# Patient Record
Sex: MALE | Race: WHITE | NOT HISPANIC OR LATINO
[De-identification: names, ages, dates, MRNs, and addresses within clinical notes are randomized per-mention and may not be internally consistent; named-entity substitution may affect disease eponyms.]

---

## 2020-11-19 ENCOUNTER — NON-APPOINTMENT (OUTPATIENT)
Age: 55
End: 2020-11-19

## 2020-11-19 ENCOUNTER — APPOINTMENT (OUTPATIENT)
Dept: CARDIOLOGY | Facility: CLINIC | Age: 55
End: 2020-11-19
Payer: COMMERCIAL

## 2020-11-19 VITALS
BODY MASS INDEX: 41.36 KG/M2 | SYSTOLIC BLOOD PRESSURE: 130 MMHG | WEIGHT: 272 LBS | OXYGEN SATURATION: 98 % | DIASTOLIC BLOOD PRESSURE: 78 MMHG | HEART RATE: 75 BPM | TEMPERATURE: 96.5 F

## 2020-11-19 VITALS — DIASTOLIC BLOOD PRESSURE: 70 MMHG | SYSTOLIC BLOOD PRESSURE: 130 MMHG

## 2020-11-19 DIAGNOSIS — K76.0 FATTY (CHANGE OF) LIVER, NOT ELSEWHERE CLASSIFIED: ICD-10-CM

## 2020-11-19 DIAGNOSIS — Z86.39 PERSONAL HISTORY OF OTHER ENDOCRINE, NUTRITIONAL AND METABOLIC DISEASE: ICD-10-CM

## 2020-11-19 DIAGNOSIS — R73.03 PREDIABETES.: ICD-10-CM

## 2020-11-19 PROCEDURE — 93000 ELECTROCARDIOGRAM COMPLETE: CPT

## 2020-11-19 PROCEDURE — 99204 OFFICE O/P NEW MOD 45 MIN: CPT | Mod: 25

## 2020-11-19 NOTE — REVIEW OF SYSTEMS
[Recent Weight Gain (___ Lbs)] : recent [unfilled] ~Ulb weight gain [Feeling Fatigued] : feeling fatigued [Blurry Vision] : no blurred vision [Seeing Double (Diplopia)] : no diplopia [Earache] : no earache [Discharge From The Ears] : no discharge from the ears [Mouth Sores] : no mouth sores [Dyspnea on exertion] : not dyspnea during exertion [Chest Pain] : no chest pain [Lower Ext Edema] : no extremity edema [Cough] : no cough [Wheezing] : no wheezing [Abdominal Pain] : no abdominal pain [Nausea] : no nausea [Heartburn] : no heartburn [Urinary Frequency] : no change in urinary frequency [Hematuria] : no hematuria [Impotence] : no impotence [Joint Pain] : no joint pain [Joint Swelling] : no joint swelling [Skin: A Rash] : no rash: [Skin Lesions] : no skin lesions [Dizziness] : no dizziness [Tremor] : no tremor was seen [Confusion] : no confusion was observed [Memory Lapses Or Loss] : no memory lapses or loss [Anxiety] : no anxiety [Excessive Thirst] : no polydipsia [Easy Bleeding] : no tendency for easy bleeding [Easy Bruising] : no tendency for easy bruising

## 2020-11-19 NOTE — ASSESSMENT
[FreeTextEntry1] : This is a 55-year-old male with prediabetes, hyperlipidemia, fatty liver, morbid obesity and prior history of DVT.  He also has erectile dysfunction with low testosterone.  His blood pressure is at goal at this time and he has no cardiac issues that he complains of.  His his sleep is fragmented but he denies any history of snoring or obstructive sleep apnea.\par EKG at rest is normal.\par We spoke in length regarding diet and weight loss.  He should be on a caloric restricted diet of about 15 to 1700 kcal/day.  He should also exercise for 45 minutes/day 5 days a week of aerobic exercise.\par Due to prior history of DVT of right leg in 2012 and murmur heard on exam I will reevaluate for presence of pulmonary hypertension and valvular heart disease.\par He is prediabetic and I think he may benefit with his fatty liver from use of Metformin.  Side effects of this medication was discussed with the patient.\par Given his risk factors and exercise a stress test will be performed for risk stratification.\par He will follow-up with us in about 6 months.  I will speak with him about test results.  He is also advised to see his PMD closely.
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
## 2020-11-19 NOTE — PHYSICAL EXAM
[Well Groomed] : well groomed [General Appearance - In No Acute Distress] : no acute distress [Normal Conjunctiva] : the conjunctiva exhibited no abnormalities [Normal Jugular Venous A Waves Present] : normal jugular venous A waves present [Normal Jugular Venous V Waves Present] : normal jugular venous V waves present [No Jugular Venous Alcantar A Waves] : no jugular venous alcantar A waves [Heart Rate And Rhythm] : heart rate and rhythm were normal [Heart Sounds] : normal S1 and S2 [Arterial Pulses Normal] : the arterial pulses were normal [Edema] : no peripheral edema present [Respiration, Rhythm And Depth] : normal respiratory rhythm and effort [Exaggerated Use Of Accessory Muscles For Inspiration] : no accessory muscle use [Auscultation Breath Sounds / Voice Sounds] : lungs were clear to auscultation bilaterally [Bowel Sounds] : normal bowel sounds [Abdomen Soft] : soft [FreeTextEntry1] : Hepatomegaly is appreciated [Abnormal Walk] : normal gait [Gait - Sufficient For Exercise Testing] : the gait was sufficient for exercise testing [Nail Clubbing] : no clubbing of the fingernails [Cyanosis, Localized] : no localized cyanosis [Petechial Hemorrhages (___cm)] : no petechial hemorrhages [Skin Color & Pigmentation] : normal skin color and pigmentation [] : no rash [No Venous Stasis] : no venous stasis [Skin Lesions] : no skin lesions [No Skin Ulcers] : no skin ulcer [No Xanthoma] : no  xanthoma was observed [Oriented To Time, Place, And Person] : oriented to person, place, and time [Affect] : the affect was normal [Mood] : the mood was normal [No Anxiety] : not feeling anxious

## 2020-11-24 RX ORDER — METFORMIN HYDROCHLORIDE 500 MG/1
500 TABLET, COATED ORAL
Qty: 180 | Refills: 1 | Status: ACTIVE | COMMUNITY
Start: 2020-11-19 | End: 1900-01-01

## 2021-01-07 ENCOUNTER — APPOINTMENT (OUTPATIENT)
Dept: CARDIOLOGY | Facility: CLINIC | Age: 56
End: 2021-01-07
Payer: COMMERCIAL

## 2021-01-07 DIAGNOSIS — I51.7 CARDIOMEGALY: ICD-10-CM

## 2021-01-07 PROCEDURE — 93306 TTE W/DOPPLER COMPLETE: CPT

## 2021-01-07 PROCEDURE — 99072 ADDL SUPL MATRL&STAF TM PHE: CPT

## 2021-01-07 PROCEDURE — 93015 CV STRESS TEST SUPVJ I&R: CPT

## 2021-02-02 PROBLEM — I51.7 MODERATE CONCENTRIC LEFT VENTRICULAR HYPERTROPHY: Status: ACTIVE | Noted: 2021-02-02

## 2021-04-01 ENCOUNTER — APPOINTMENT (OUTPATIENT)
Dept: MRI IMAGING | Facility: CLINIC | Age: 56
End: 2021-04-01
Payer: COMMERCIAL

## 2021-04-01 ENCOUNTER — OUTPATIENT (OUTPATIENT)
Dept: OUTPATIENT SERVICES | Facility: HOSPITAL | Age: 56
LOS: 1 days | End: 2021-04-01
Payer: COMMERCIAL

## 2021-04-01 DIAGNOSIS — I51.7 CARDIOMEGALY: ICD-10-CM

## 2021-04-01 PROCEDURE — 75561 CARDIAC MRI FOR MORPH W/DYE: CPT | Mod: 26

## 2021-04-01 PROCEDURE — 75561 CARDIAC MRI FOR MORPH W/DYE: CPT

## 2021-04-01 PROCEDURE — A9585: CPT

## 2021-04-15 ENCOUNTER — RESULT REVIEW (OUTPATIENT)
Age: 56
End: 2021-04-15

## 2021-05-27 ENCOUNTER — APPOINTMENT (OUTPATIENT)
Dept: CARDIOLOGY | Facility: CLINIC | Age: 56
End: 2021-05-27

## 2022-08-25 ENCOUNTER — NON-APPOINTMENT (OUTPATIENT)
Age: 57
End: 2022-08-25

## 2022-08-30 ENCOUNTER — NON-APPOINTMENT (OUTPATIENT)
Age: 57
End: 2022-08-30

## 2022-08-30 ENCOUNTER — APPOINTMENT (OUTPATIENT)
Dept: OPHTHALMOLOGY | Facility: CLINIC | Age: 57
End: 2022-08-30

## 2022-08-30 PROCEDURE — 92014 COMPRE OPH EXAM EST PT 1/>: CPT

## 2022-08-30 PROCEDURE — 92133 CPTRZD OPH DX IMG PST SGM ON: CPT

## 2023-08-29 ENCOUNTER — NON-APPOINTMENT (OUTPATIENT)
Age: 58
End: 2023-08-29

## 2023-08-29 ENCOUNTER — APPOINTMENT (OUTPATIENT)
Dept: OPHTHALMOLOGY | Facility: CLINIC | Age: 58
End: 2023-08-29
Payer: COMMERCIAL

## 2023-08-29 PROCEDURE — 92014 COMPRE OPH EXAM EST PT 1/>: CPT

## 2023-08-29 PROCEDURE — 92133 CPTRZD OPH DX IMG PST SGM ON: CPT

## 2024-08-15 ENCOUNTER — NON-APPOINTMENT (OUTPATIENT)
Age: 59
End: 2024-08-15

## 2024-08-15 ENCOUNTER — APPOINTMENT (OUTPATIENT)
Dept: CARDIOLOGY | Facility: CLINIC | Age: 59
End: 2024-08-15

## 2024-08-15 VITALS
BODY MASS INDEX: 36.07 KG/M2 | OXYGEN SATURATION: 97 % | HEIGHT: 68 IN | HEART RATE: 74 BPM | SYSTOLIC BLOOD PRESSURE: 134 MMHG | DIASTOLIC BLOOD PRESSURE: 78 MMHG | WEIGHT: 238 LBS

## 2024-08-15 DIAGNOSIS — R80.9 TYPE 2 DIABETES MELLITUS WITH OTHER DIABETIC KIDNEY COMPLICATION: ICD-10-CM

## 2024-08-15 DIAGNOSIS — Z86.39 PERSONAL HISTORY OF OTHER ENDOCRINE, NUTRITIONAL AND METABOLIC DISEASE: ICD-10-CM

## 2024-08-15 DIAGNOSIS — Z87.19 PERSONAL HISTORY OF OTHER DISEASES OF THE DIGESTIVE SYSTEM: ICD-10-CM

## 2024-08-15 DIAGNOSIS — R73.03 PREDIABETES.: ICD-10-CM

## 2024-08-15 DIAGNOSIS — E11.29 TYPE 2 DIABETES MELLITUS WITH OTHER DIABETIC KIDNEY COMPLICATION: ICD-10-CM

## 2024-08-15 DIAGNOSIS — Z86.718 PERSONAL HISTORY OF OTHER VENOUS THROMBOSIS AND EMBOLISM: ICD-10-CM

## 2024-08-15 DIAGNOSIS — I51.7 CARDIOMEGALY: ICD-10-CM

## 2024-08-15 PROCEDURE — 99203 OFFICE O/P NEW LOW 30 MIN: CPT | Mod: 25

## 2024-08-15 PROCEDURE — 93000 ELECTROCARDIOGRAM COMPLETE: CPT

## 2024-08-15 RX ORDER — SEMAGLUTIDE 0.68 MG/ML
2 INJECTION, SOLUTION SUBCUTANEOUS
Refills: 0 | Status: ACTIVE | COMMUNITY

## 2024-08-15 RX ORDER — TESTOSTERONE 20.25 MG/1.25G
GEL, METERED TRANSDERMAL
Refills: 0 | Status: ACTIVE | COMMUNITY

## 2024-08-15 RX ORDER — OMEPRAZOLE 20 MG/1
20 CAPSULE, DELAYED RELEASE ORAL
Refills: 0 | Status: ACTIVE | COMMUNITY

## 2024-08-15 RX ORDER — ATORVASTATIN CALCIUM 20 MG/1
20 TABLET, FILM COATED ORAL
Refills: 0 | Status: ACTIVE | COMMUNITY

## 2024-08-15 RX ORDER — LISINOPRIL 5 MG/1
5 TABLET ORAL
Refills: 0 | Status: ACTIVE | COMMUNITY
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
## 2024-08-15 NOTE — CARDIOLOGY SUMMARY
[de-identified] : 8/15/2024:  Sinus Rhythm  WITHIN NORMAL LIMITS [de-identified] : 1/7/2021:  LVEF 68%, moderate concentric LVH, nor significant valve disease [de-identified] : 1/7/2021:  normal ECG response to 10 METs with normal BP response [de-identified] : 4/8/2021 Cardiac MRI with normal LV wall thicknesses and no LGE

## 2024-08-15 NOTE — REVIEW OF SYSTEMS
[Weight Loss (___ Lbs)] : [unfilled] ~Ulb weight loss [Negative] : Heme/Lymph [FreeTextEntry7] : loose stools once a week on Ozempic [FreeTextEntry9] : occasional knee pain

## 2024-08-27 ENCOUNTER — APPOINTMENT (OUTPATIENT)
Dept: OPHTHALMOLOGY | Facility: CLINIC | Age: 59
End: 2024-08-27
Payer: COMMERCIAL

## 2024-08-27 ENCOUNTER — NON-APPOINTMENT (OUTPATIENT)
Age: 59
End: 2024-08-27

## 2024-08-27 PROCEDURE — 92014 COMPRE OPH EXAM EST PT 1/>: CPT

## 2024-08-30 ENCOUNTER — APPOINTMENT (OUTPATIENT)
Dept: CARDIOLOGY | Facility: CLINIC | Age: 59
End: 2024-08-30
Payer: COMMERCIAL

## 2024-08-30 PROCEDURE — 93880 EXTRACRANIAL BILAT STUDY: CPT

## 2024-08-30 PROCEDURE — 93015 CV STRESS TEST SUPVJ I&R: CPT

## 2025-08-21 ENCOUNTER — NON-APPOINTMENT (OUTPATIENT)
Age: 60
End: 2025-08-21

## 2025-08-22 ENCOUNTER — APPOINTMENT (OUTPATIENT)
Dept: CARDIOLOGY | Facility: CLINIC | Age: 60
End: 2025-08-22

## 2025-08-22 VITALS
DIASTOLIC BLOOD PRESSURE: 78 MMHG | SYSTOLIC BLOOD PRESSURE: 126 MMHG | HEART RATE: 80 BPM | BODY MASS INDEX: 35.92 KG/M2 | OXYGEN SATURATION: 97 % | HEIGHT: 68 IN | WEIGHT: 237 LBS

## 2025-08-22 DIAGNOSIS — Z86.718 PERSONAL HISTORY OF OTHER VENOUS THROMBOSIS AND EMBOLISM: ICD-10-CM

## 2025-08-22 DIAGNOSIS — R80.9 TYPE 2 DIABETES MELLITUS WITH OTHER DIABETIC KIDNEY COMPLICATION: ICD-10-CM

## 2025-08-22 DIAGNOSIS — E11.29 TYPE 2 DIABETES MELLITUS WITH OTHER DIABETIC KIDNEY COMPLICATION: ICD-10-CM

## 2025-08-22 DIAGNOSIS — I51.7 CARDIOMEGALY: ICD-10-CM

## 2025-08-22 PROCEDURE — 93000 ELECTROCARDIOGRAM COMPLETE: CPT

## 2025-08-22 PROCEDURE — 99213 OFFICE O/P EST LOW 20 MIN: CPT | Mod: 25

## 2025-08-25 ENCOUNTER — NON-APPOINTMENT (OUTPATIENT)
Age: 60
End: 2025-08-25

## 2025-08-25 ENCOUNTER — APPOINTMENT (OUTPATIENT)
Dept: OPHTHALMOLOGY | Facility: CLINIC | Age: 60
End: 2025-08-25
Payer: COMMERCIAL

## 2025-08-25 PROCEDURE — 92014 COMPRE OPH EXAM EST PT 1/>: CPT

## 2025-09-08 ENCOUNTER — APPOINTMENT (OUTPATIENT)
Dept: CT IMAGING | Facility: CLINIC | Age: 60
End: 2025-09-08
Payer: COMMERCIAL

## 2025-09-08 PROCEDURE — 75571 CT HRT W/O DYE W/CA TEST: CPT
